# Patient Record
Sex: FEMALE | Race: AMERICAN INDIAN OR ALASKA NATIVE
[De-identification: names, ages, dates, MRNs, and addresses within clinical notes are randomized per-mention and may not be internally consistent; named-entity substitution may affect disease eponyms.]

---

## 2022-08-20 ENCOUNTER — HOSPITAL ENCOUNTER (EMERGENCY)
Dept: HOSPITAL 5 - ED | Age: 67
Discharge: HOME | End: 2022-08-20
Payer: MEDICARE

## 2022-08-20 VITALS — DIASTOLIC BLOOD PRESSURE: 88 MMHG | SYSTOLIC BLOOD PRESSURE: 169 MMHG

## 2022-08-20 DIAGNOSIS — L03.211: ICD-10-CM

## 2022-08-20 DIAGNOSIS — L02.01: Primary | ICD-10-CM

## 2022-08-20 DIAGNOSIS — I10: ICD-10-CM

## 2022-08-20 LAB
ALBUMIN SERPL-MCNC: 4.5 G/DL (ref 3.9–5)
ALT SERPL-CCNC: 23 UNITS/L (ref 7–56)
BASOPHILS # (AUTO): 0 K/MM3 (ref 0–0.1)
BASOPHILS NFR BLD AUTO: 0.9 % (ref 0–1.8)
BUN SERPL-MCNC: 11 MG/DL (ref 7–17)
BUN/CREAT SERPL: 12 %
CALCIUM SERPL-MCNC: 9.4 MG/DL (ref 8.4–10.2)
EOSINOPHIL # BLD AUTO: 0.1 K/MM3 (ref 0–0.4)
EOSINOPHIL NFR BLD AUTO: 4 % (ref 0–4.3)
HCT VFR BLD CALC: 36.1 % (ref 30.3–42.9)
HEMOLYSIS INDEX: 3
HGB BLD-MCNC: 12.5 GM/DL (ref 10.1–14.3)
LYMPHOCYTES # BLD AUTO: 0.4 K/MM3 (ref 1.2–5.4)
LYMPHOCYTES NFR BLD AUTO: 12.6 % (ref 13.4–35)
MCHC RBC AUTO-ENTMCNC: 35 % (ref 30–34)
MCV RBC AUTO: 89 FL (ref 79–97)
MONOCYTES # (AUTO): 0.4 K/MM3 (ref 0–0.8)
MONOCYTES % (AUTO): 12.3 % (ref 0–7.3)
PLATELET # BLD: 192 K/MM3 (ref 140–440)
RBC # BLD AUTO: 4.09 M/MM3 (ref 3.65–5.03)

## 2022-08-20 PROCEDURE — 96365 THER/PROPH/DIAG IV INF INIT: CPT

## 2022-08-20 PROCEDURE — 96361 HYDRATE IV INFUSION ADD-ON: CPT

## 2022-08-20 PROCEDURE — 70487 CT MAXILLOFACIAL W/DYE: CPT

## 2022-08-20 PROCEDURE — 80053 COMPREHEN METABOLIC PANEL: CPT

## 2022-08-20 PROCEDURE — 36415 COLL VENOUS BLD VENIPUNCTURE: CPT

## 2022-08-20 PROCEDURE — 85025 COMPLETE CBC W/AUTO DIFF WBC: CPT

## 2022-08-20 PROCEDURE — 99284 EMERGENCY DEPT VISIT MOD MDM: CPT

## 2022-08-20 PROCEDURE — 96375 TX/PRO/DX INJ NEW DRUG ADDON: CPT

## 2022-08-20 NOTE — CAT SCAN REPORT
CT MAXILLOFACIAL WITH CONTRAST



INDICATION / CLINICAL INFORMATION: R periorbital swelling/pain, blistering on forehead.



TECHNIQUE: All CT scans at this location are performed using CT dose reduction for ALARA by means of 
automated exposure control. 



COMPARISON: None available.



FINDINGS:



FACIAL BONES: No fracture or other significant abnormality.

PARANASAL SINUSES: No significant abnormality.

ORBITS: Significant right periorbital swelling. No retroconal fat stranding. Post septal soft tissues
 are preserved. Chronic deformity of the medial right orbital wall



SOFT TISSUES: Multiple cutaneous nodules/lesions scattered throughout the soft tissues of the face pr
edominantly on the forehead. Several prominent upper cervical and submandibular lymph nodes.



VISUALIZED INTRACRANIAL STRUCTURES: No significant abnormality.  



ADDITIONAL FINDINGS: Multilevel discogenic degenerative changes in the cervical spine.



IMPRESSION:

1. Extensive right periorbital soft tissue swelling, which can be seen with preseptal/periorbital nathen
lulitis. No abscess or evidence of post septal involvement. Upper cervical and submandibular lymphade
nopathy is likely reactive.

2. Multiple cutaneous nodules/lesions in the soft tissues of the face. Recommend direct visualization
.



Signer Name: Patricio Aquino MD 

Signed: 8/20/2022 8:12 AM

Workstation Name: [id number]

## 2022-08-20 NOTE — EMERGENCY DEPARTMENT REPORT
HPI





- General


Chief Complaint: Skin Rash


PUI?: No


Time Seen by Provider: 08/20/22 06:46





- HPI


HPI: 





66-year-old female presents for evaluation of 3 days of right facial pain and 

blistering and itching.  She states she wiped her face with a paper towel 3 days

ago and subsequently developed pain redness and blistering.  She states none of 

the symptoms have worsened but the remainder persisted.  She denies any vision 

changes or pain with movement of her eye.  She states her eye is swollen.  She 

states before wiping her face with a paper towel she did not feel any tingling 

numbness or pain.  No prior history of similar symptoms.  She denies any 

headaches vision changes tingling numbness in her arms or legs.  No difficulty 

swallowing her oral secretions.  Pain currently 8 out of 10.





ED Past Medical Hx





- Past Medical History


Hx Hypertension: Yes





- Social History


Smoking Status: Never Smoker


Substance Use Type: Alcohol





- Medications


Home Medications: 


                                Home Medications











 Medication  Instructions  Recorded  Confirmed  Last Taken  Type


 


Colchicine 0.6 mg PO Q1H #3 capsule 12/12/18  Unknown Rx


 


HYDROcodone/APAP 5-325 [[place] 1 each PO Q6HR PRN #10 tablet 12/12/18  Unknown Rx





5/325]     


 


Ibuprofen [Ibu] 400 mg PO QID #16 tablet 12/12/18  Unknown Rx


 


Clindamycin [Clindamycin CAP] 450 mg PO Q8HR 10 Days #30 capsule 08/20/22  

Unknown Rx


 


Ondansetron [Zofran Odt] 4 mg PO Q8HR 3 Days #12 tab.rapdis 08/20/22  Unknown Rx


 


Valacyclovir HCl [Valacyclovir] 1,000 mg PO TID 7 Days #21 08/20/22  Unknown Rx


 


methylPREDNISolone [Medrol 4MG 4 mg PO DAILY 6 Days #21 08/20/22  Unknown Rx





DOSEPAK (21 tabs)]     














ED Review of Systems


ROS: 


Stated complaint: FACIAL RASH


Other details as noted in HPI





Comment: All other systems reviewed and negative


Eyes: other (pain to R upper eyelid, R facial pain, redness, swelling).  denies:

eye discharge, vision change





Physical Exam





- Physical Exam


Vital Signs: 


                                   Vital Signs











  08/20/22 08/20/22





  05:11 09:35


 


Temperature 98.3 F 98.6 F


 


Pulse Rate 99 H 70


 


Respiratory 18 11 L





Rate  


 


Blood Pressure 122/83 169/88





[Right]  


 


O2 Sat by Pulse 99 99





Oximetry  











General: 


Gen: pt is well appearing, no acute distress


HEENT: Normocephalic ; pt noted to have multiple blisters on R forehead; , not 

on erythematous base, and blisters noted to be on superior aspect of R forehead;

 no  pupils equally round and reactive to light extraocular muscles intact; 

sclerae anicteric; globe grossly intact; sclerae of R eye is not injected; no 

purulent drainage; no pain with EOM of R eye per pt's report; no proptosis; mild

 R periorbital edema no blistering involvement of R eye itself or 

eyelid/superior cheek; mild erythema to R eyelid; no dacrocystitis ; blisters 

are asymmetric


Neck: Full range of motion, no midline spinal tenderness palpation, no JVD, no 

carotid bruits, no nuchal rigidity; mild palpable R anterior cervical LAD


CVS: S1-S2 regular rate and rhythm with no gallops rubs or murmurs, chest wall 

nontender


Pulmonary: Clear to auscultation bilaterally, no wheezes rales or rhonchi


Abdomen: Soft nondistended nontender no guarding or rebound tenderness, no 

palpable deformities or step-offs, normal active bowel sounds, no 

hepatosplenomegaly, no pulsatile masses


: Deferred


Extremities: No cyanosis no clubbing no edema, intact distal peripheral pulses,


Integumentary: Skin normal, no petechia no purpura no abscess no lacerations no 

evidence of trauma no evidence of infection


Neuro: Patient is awake alert and oriented to person place time situation, 

mentating well, cranial nerves II through XII intact, no focal neurodeficits, 

sensation grossly tact


Psych: Calm cooperative, mood affect normal





ED Course


                                   Vital Signs











  08/20/22 08/20/22





  05:11 09:35


 


Temperature 98.3 F 98.6 F


 


Pulse Rate 99 H 70


 


Respiratory 18 11 L





Rate  


 


Blood Pressure 122/83 169/88





[Right]  


 


O2 Sat by Pulse 99 99





Oximetry  














- Reevaluation(s)


Reevaluation #1: 





08/20/22 09:47


pt is asleep on stretcher; she is easily arousable; she reports her pain has 

improved. She has an appt with her pcp on today,[place] fields, 20, 2022, at 11:00am.

Pt verbalized she would like to be discharged so she can follow-up with her PCP 

this morning as scheduled.





ED Medical Decision Making





- Lab Data


Result diagrams: 


                                 08/20/22 07:15





                                 08/20/22 07:15





- Radiology Data


Radiology results: report reviewed





- Medical Decision Making





66-year-old female presents for evaluation of right-sided facial pain plus 

tearing and right periorbital pain without globe involvement or visual acuity 

changes.  Vital signs stable.  Patient afebrile.  CT imaging results reviewed.  

Patient has no evidence of direct ocular involvement.  She was treated with 

steroids and morphine here.  She was also given a dose of clindamycin 900 mg 

intravenously.  It is unclear if the patient's symptoms may be secondary to 

pathology from cellulitis and or a concomitant herpes zoster infection.  

Therefore she will be treated for both in the form of clindamycin as well as the

valacyclovir.  Overall she is very well-appearing and she confirms that she has 

an appointment scheduled this morning at 11 AM with her primary care doctor.  

Patient states she would like to be discharged to follow-up with her doctor at 

11 AM this morning.





No further emergent work-up warranted at this time.  Prior to discharge she was 

given strict verbal and written return precautions.  Patient verbalized 

understanding and agreement the plan of care.


Critical Care Time: No


Critical care attestation.: 


If time is entered above; I have spent that time in minutes in the direct care 

of this critically ill patient, excluding procedure time.








ED Disposition


Clinical Impression: 


 Cellulitis and abscess of face





Disposition: 01 HOME / SELF CARE / HOMELESS


Is pt being admited?: No


Does the pt Need Aspirin: No


Condition: Stable


Instructions:  Cellulitis, Adult, Easy-to-Read


Additional Instructions: 


Apply cool compresses to your face, 2-3 times a day, for the next 7 to 10 days.





Avoid rubbing or scratching your face, as this may worsen your infection.





It is unclear if you have shingles because your rash appears similar to what 

shingles can look like.  It is also unclear if your rash is due to a regular 

skin infection that is causing blistering.  Therefore to cover all possible 

sources of infection, you are being placed on antibiotics to cover shingles as 

well as a regular bacterial infection.  Please take both of these antibiotics as

directed until both antibiotics have been completed.





Start your medication for shingles today.  Start ibuprofen and/or acetaminophen 

today as well.  Your other medications should be started on tomorrow, Sunday, 

August 21, 2022.








Take ibuprofen as needed for pain.  You may alternate ibuprofen with 

acetaminophen for additional pain management.





Follow-up with your 2 doctor today at 11 AM for reassessment.  This is very 

important.





Observe your symptoms very carefully.  Return to the nearest emergency 

department as soon as possible if you develop severe headaches, vomiting, 

dizziness, any vision changes, spreading redness, any fever of 100.4 Fahrenheit 

or higher, inability tolerate liquids or solids, or if any other new worrisome 

symptoms develop


Prescriptions: 


Clindamycin [Clindamycin CAP] 450 mg PO Q8HR 10 Days #30 capsule


methylPREDNISolone [Medrol 4MG DOSEPAK (21 tabs)] 4 mg PO DAILY 6 Days #21


Valacyclovir HCl [Valacyclovir] 1,000 mg PO TID 7 Days #21


Ondansetron [Zofran Odt] 4 mg PO Q8HR 3 Days #12 tab.earlene

## 2022-08-21 ENCOUNTER — HOSPITAL ENCOUNTER (EMERGENCY)
Dept: HOSPITAL 5 - ED | Age: 67
Discharge: HOME | End: 2022-08-21
Payer: MEDICARE

## 2022-08-21 VITALS — DIASTOLIC BLOOD PRESSURE: 85 MMHG | SYSTOLIC BLOOD PRESSURE: 146 MMHG

## 2022-08-21 DIAGNOSIS — L03.211: ICD-10-CM

## 2022-08-21 DIAGNOSIS — L02.01: Primary | ICD-10-CM

## 2022-08-21 PROCEDURE — 99282 EMERGENCY DEPT VISIT SF MDM: CPT
